# Patient Record
Sex: FEMALE | Race: WHITE | Employment: FULL TIME | URBAN - METROPOLITAN AREA
[De-identification: names, ages, dates, MRNs, and addresses within clinical notes are randomized per-mention and may not be internally consistent; named-entity substitution may affect disease eponyms.]

---

## 2020-07-23 ENCOUNTER — VIRTUAL VISIT (OUTPATIENT)
Dept: DERMATOLOGY | Facility: CLINIC | Age: 40
End: 2020-07-23
Payer: COMMERCIAL

## 2020-07-23 DIAGNOSIS — R05.9 COUGH: ICD-10-CM

## 2020-07-23 DIAGNOSIS — L81.1 MELASMA: Primary | ICD-10-CM

## 2020-07-23 PROBLEM — J30.81 ALLERGIC RHINITIS DUE TO ANIMAL (CAT) (DOG) HAIR AND DANDER: Status: ACTIVE | Noted: 2019-09-02

## 2020-07-23 PROBLEM — S62.630B DISPLACED FRACTURE OF DISTAL PHALANX OF RIGHT INDEX FINGER, INITIAL ENCOUNTER FOR OPEN FRACTURE: Status: ACTIVE | Noted: 2017-12-07

## 2020-07-23 PROBLEM — T78.1XXA POLLEN-FOOD ALLERGY: Status: ACTIVE | Noted: 2019-09-02

## 2020-07-23 PROBLEM — J30.1 CHRONIC SEASONAL ALLERGIC RHINITIS DUE TO POLLEN: Status: ACTIVE | Noted: 2019-09-02

## 2020-07-23 PROBLEM — J30.89 ALLERGIC RHINITIS DUE TO MOLD: Status: ACTIVE | Noted: 2019-09-02

## 2020-07-23 PROBLEM — S68.625A PARTIAL TRAUMATIC TRANSPHALANGEAL AMPUTATION OF LEFT RING FINGER: Status: ACTIVE | Noted: 2017-12-07

## 2020-07-23 PROBLEM — H10.13 ALLERGIC CONJUNCTIVITIS OF BOTH EYES: Status: ACTIVE | Noted: 2019-09-02

## 2020-07-23 PROBLEM — J30.89 ALLERGIC RHINITIS DUE TO DUST MITE: Status: ACTIVE | Noted: 2019-09-02

## 2020-07-23 PROCEDURE — 99202 OFFICE O/P NEW SF 15 MIN: CPT | Mod: 95 | Performed by: DERMATOLOGY

## 2020-07-23 RX ORDER — GABAPENTIN 300 MG/1
300 CAPSULE ORAL
COMMUNITY
Start: 2019-04-29

## 2020-07-23 RX ORDER — FEXOFENADINE HCL 180 MG/1
180 TABLET ORAL
COMMUNITY

## 2020-07-23 RX ORDER — SUMATRIPTAN 100 MG/1
100 TABLET, FILM COATED ORAL
COMMUNITY
Start: 2019-06-09

## 2020-07-23 RX ORDER — TRETINOIN 0.25 MG/G
CREAM TOPICAL
Qty: 45 G | Refills: 1 | Status: SHIPPED | OUTPATIENT
Start: 2020-07-23 | End: 2020-10-30

## 2020-07-23 RX ORDER — AZELAIC ACID 0.15 G/G
GEL TOPICAL
Qty: 50 G | Refills: 3 | Status: SHIPPED | OUTPATIENT
Start: 2020-07-23

## 2020-07-23 RX ORDER — ERENUMAB-AOOE 70 MG/ML
INJECTION SUBCUTANEOUS
COMMUNITY
Start: 2019-07-18

## 2020-07-23 RX ORDER — PSYLLIUM HUSK 0.4 G
1000 CAPSULE ORAL
COMMUNITY

## 2020-07-23 RX ORDER — MONTELUKAST SODIUM 10 MG/1
10 TABLET ORAL
COMMUNITY
Start: 2019-08-15 | End: 2021-05-11

## 2020-07-23 ASSESSMENT — PAIN SCALES - GENERAL: PAINLEVEL: NO PAIN (0)

## 2020-07-23 NOTE — LETTER
7/23/2020         RE: Caterina Sainz  [address]  [place] [address]        Dear Colleague,    Thank you for referring your patient, Caterina Sainz, to the [facility]. Please see a copy of my visit note below.    [facility]  Mychart Connected      Impression and Recommendations (Patient Counseled on the Following):  1. Hyperpigmentation ,left forehead, most likely melasma with solar lentigenes  -sun protection, recommend skinsceuticals daily  -finacea once daily in the am  - Hydroquinone can be considered, please check FDA website regarding studies recommending on this  2. Cough, had body aches, and cold like symptoms, reports no coronavirus contact  -recommend on care for coronavirus testing.  ordered         Follow-up:   Follow-up with dermatology in approximately 6 weeks. Earlier for new or changing lesions or rash.      Staff only    Janki Steele MD    Department of Dermatology  [facility]: Phone: [phone], Fax:[phone]  [facility]: Phone: [phone], Fax: [phone]        _____________________________________________________________________________    Dermatology Problem List:  NEw    Encounter Date: Jul 23, 2020    CC:   Chief Complaint   Patient presents with     Derm Problem     Dark Spots          History of Present Illness:  I have reviewed the teledermatology information and the nursing intake corresponding to this issue. Caterina Sainz is a 40 year old female who presents via teledermatology for dark spots on the face, forehead, right and left cheeks, more on the left started this summer. Using sunscreen. Has IUD since 2017. NO darkening upper lip.     Has seen derm in the past but no hx of skin cancer       ROS:    Had sinus infection 2 months ago. Has  A cold today.   Physical  "Examination:  General: Well-appearing , appropriately-developed individual.  Skin: Focused examination within the teledermatology photograph(s) including face was performed.  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  No past surgical history on file.    Social History:  Patient reports that she has never smoked. She has never used smokeless tobacco.working from home    Family History:  No family history on file.  NO family hx of skin cancer  Medications:  Current Outpatient Medications   Medication     cholecalciferol (VITAMIN D-1000 MAX ST) 25 MCG (1000 UT) TABS     erenumab-aooe (AIMOVIG) 70 MG/ML injection     fexofenadine (ALLEGRA) 180 MG tablet     montelukast (SINGULAIR) 10 MG tablet     SUMAtriptan (IMITREX) 100 MG tablet     Topiramate (TOPAMAX PO)     gabapentin (NEURONTIN) 300 MG capsule     No current facility-administered medications for this visit.           Allergies   Allergen Reactions     Nuts Other (See Comments)     \"Oral allergy syndrome.\"     Cats Itching     No Clinical Screening - See Comments Itching     Seasonal, dogs, cats, trees, dust, mold          _____________________________________________________________________________    Teledermatology information:  - Location of patient: [place]  - Patient presented as: self referral  - " "Location of teledermatologist:  ([facility] )  - Reason teledermatology is appropriate:  of National Emergency Regarding Coronavirus disease (COVID 19) Outbreak  - Image quality and interpretability: acceptable  - Physician has received verbal consent for a Video/Photos Visit from the patient? Yes  - In-person dermatology visit recommendation: no  - Date of images: 7/21/2020  - Service start time:8:20am  - Service end time:8:40am  - Date of report: 7/23/2020           Teledermatology Nurse Call for NEW Patients (not seen in last 3 years):     The patient was contacted by phone and we reviewed they have a visit in teledermatology upcoming with an MD or [facility];  Importantly, \"a teledermatology visit may not be as thorough as an in-person visit, and the quality of the photograph and/or video sent may not be of the same quality as that taken by the dermatology clinic.\"     This video visit will be conducted via a call between you and your physician/provider via [employer]. We have found that certain health care needs can be provided without the need for an in-person physical exam.  This service lets us provide the care you need with a video conversation.  If a prescription is necessary we can send it directly to your pharmacy.  If lab work is needed we can place an order for that and you can then stop by our lab to have the test done at a later time.If during the course of the call the physician/provider feels a video visit is not appropriate, you will not be charged for this service.Visits are billed at different rates depending on your insurance coverage. Please reach out to your insurance provider with any questions.    The patient will also send photographs via [employer] for review. Instructions for photography are/were sent to the patient via [employer] messaging.       The patient verified the location of the photo/video visit to be [place].(The physician must be notified by nurse if the patient is not in " the state of [place] during the encounter)    The patient denied skin pain, fever, mucosal symptoms(lesions, blisters, sores in the mouth, nose, eyes, or genitals)  IF PATIENT ENDORSES ANY OF THESE STOP AND PAGE ON CALL ATTENDING                    Again, thank you for allowing me to participate in the care of your patient.        Sincerely,        Janki Steele MD

## 2020-07-23 NOTE — PATIENT INSTRUCTIONS
[facility] Teledermatology Visit    Thank you for allowing us to participate in your care. Your findings, instructions and follow-up plan are as follows:  1.skinceuticals fusion sunscreen Shipped to your Home From [facility]:   -Call [phone] and ask to have product shipped to you.   2. Finacea(azelaic acid once daily)  3. Hydroquinone can be considered, please check FDA website regarding studies recommending on this  4. Tretinoin at night for aging and melasma  5. Cetaphil or CeraVE    When should I call my doctor?    If you are worsening or not improving, please, contact us or seek urgent care as noted below.     Who should I call with questions (adults)?    [facility] (adult and pediatric): [phone]     [facility] (adult): [phone]    For urgent needs outside of business hours call the [facility] at [phone] and ask for the dermatology resident on call    If this is a medical emergency and you are unable to reach an ER, Call [phone]      Who should I call with questions (pediatric)?  [facility]- Pediatric Dermatology  Dr. Sadaf Humphries, Dr. Myla Harding, Dr. Maryann Mckenna, Prema Araujo, MARSHA Goodman, Dr. Jaky Nieto & Dr. Jamar Jose  Non Urgent  Nurse Triage Line; [phone]- Danuta and Jennifer TONEY Care Coordinators   Marcela (/Complex ) [phone]    If you need a prescription refill, please contact your pharmacy. Refills are approved or denied by our Physicians during normal business hours, Monday through Fridays  Per office policy, refills will not be granted if you have not been seen within the past year (or sooner depending on your child's condition)    Scheduling Information:  Pediatric Appointment Scheduling and Call Center [phone]  Radiology Scheduling- [phone]  Sedation Unit Scheduling-  [phone]  [facility]- General [phone]; Pediatric Dermatology [phone]  Main  Services: [phone]  Amharic: [phone]  East Timorese: [phone]  Hmong/Citizen of [place]/Syriac: [phone]  Preadmission Nursing Department Fax Number: [phone] (Fax all pre-operative paperwork to this number)    For urgent matters arising during evenings, weekends, or holidays that cannot wait for normal business hours please call [phone] and ask for the Dermatology Resident On-Call to be paged.    Topical Retinoids    What are topical retinoids?    These are medicines that are related to Vitamin A. They are used on the skin.    Retin-A , Renova , Differin , and Tazorac  are brand names.    Come in creams and clear gels    Used to treat skin conditions like pimples (acne), face wrinkling, or dark-colored sunspots    How do I use these medicines?    Wash face and let dry for 15 to 30 minutes.    Use a large pea-size amount of medicine to cover the whole face. Do not put on close to the eyes and lips. Rub in gently.     Start by using every other day. If you have no irritation after a few days, start to use it daily.     You might have too much irritation with daily use. Use it less often until the irritation goes away. Then try to increase slowly to daily use.     Irritation improves over time.    You may use moisturizer if your skin becomes dry. Look for  non-comedogenic  (non-pore plugging) and oil free products.     What are the side effects?    Dryness     Peeling and flaking     Irritation of the skin     Possible increased chance of sunburns. Protect your skin from sunlight. Wear a hat and use a sunscreen with SPF 30 or higher. Your sunscreen should have both UVA and UVB (broad-spectrum) protection.    Who should I call with questions?    [facility]: [phone]     [facility]: [phone]    For urgent needs outside of business  hours call the [facility] at [phone] and ask for the dermatology resident on call

## 2020-07-23 NOTE — PROGRESS NOTES
[facility]  Mychart [facility]      Impression and Recommendations (Patient Counseled on the Following):  1. Hyperpigmentation ,left forehead, most likely melasma with solar lentigenes  -sun protection, recommend skinsceuticals daily  -finacea once daily in the am  - Hydroquinone can be considered, please check FDA website regarding studies recommending on this  2. Cough, had body aches, and cold like symptoms, reports no coronavirus contact  -recommend on care for coronavirus testing.  ordered         Follow-up:   Follow-up with dermatology in approximately 6 weeks. Earlier for new or changing lesions or rash.      Staff only    Janki Steele MD    Department of Dermatology  [facility]: Phone: [phone], Fax:[phone]  [facility]: Phone: [phone], Fax: [phone]        _____________________________________________________________________________    Dermatology Problem List:  NEw    Encounter Date: Jul 23, 2020    CC:   Chief Complaint   Patient presents with     Derm Problem     Dark Spots          History of Present Illness:  I have reviewed the teledermatology information and the nursing intake corresponding to this issue. Caterina Sainz is a 40 year old female who presents via teledermatology for dark spots on the face, forehead, right and left cheeks, more on the left started this summer. Using sunscreen. Has IUD since 2017. NO darkening upper lip.     Has seen derm in the past but no hx of skin cancer       ROS:    Had sinus infection 2 months ago. Has  A cold today.   Physical Examination:  General: Well-appearing , appropriately-developed individual.  Skin: Focused examination within the teledermatology photograph(s) including face was performed.  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  No past surgical history on file.    Social History:  Patient reports that she has never smoked. She has never used smokeless tobacco.working from home    Family History:  No family history on file.  NO family hx of skin cancer  Medications:  Current Outpatient Medications   Medication     cholecalciferol (VITAMIN D-1000 MAX ST) 25 MCG (1000 UT) TABS     erenumab-aooe (AIMOVIG) 70 MG/ML injection     fexofenadine (ALLEGRA) 180 MG tablet     montelukast (SINGULAIR) 10 MG tablet     SUMAtriptan (IMITREX) 100 MG tablet     Topiramate (TOPAMAX PO)     gabapentin (NEURONTIN) 300 MG capsule     No current facility-administered medications for this visit.           Allergies   Allergen Reactions     Nuts Other (See Comments)     \"Oral allergy syndrome.\"     Cats Itching     No Clinical Screening - See Comments Itching     Seasonal, dogs, cats, trees, dust, mold          _____________________________________________________________________________    Teledermatology information:  - Location of patient: [place]  - Patient presented as: self referral  - Location of teledermatologist:  ([facility] )  - Reason teledermatology is appropriate:  of National Emergency Regarding Coronavirus disease (COVID 19) Outbreak  - Image quality and interpretability: acceptable  - Physician has received verbal consent for " "a Video/Photos Visit from the patient? Yes  - In-person dermatology visit recommendation: no  - Date of images: 7/21/2020  - Service start time:8:20am  - Service end time:8:40am  - Date of report: 7/23/2020           Teledermatology Nurse Call for NEW Patients (not seen in last 3 years):     The patient was contacted by phone and we reviewed they have a visit in teledermatology upcoming with an MD or [facility];  Importantly, \"a teledermatology visit may not be as thorough as an in-person visit, and the quality of the photograph and/or video sent may not be of the same quality as that taken by the dermatology clinic.\"     This video visit will be conducted via a call between you and your physician/provider via [employer]. We have found that certain health care needs can be provided without the need for an in-person physical exam.  This service lets us provide the care you need with a video conversation.  If a prescription is necessary we can send it directly to your pharmacy.  If lab work is needed we can place an order for that and you can then stop by our lab to have the test done at a later time.If during the course of the call the physician/provider feels a video visit is not appropriate, you will not be charged for this service.Visits are billed at different rates depending on your insurance coverage. Please reach out to your insurance provider with any questions.    The patient will also send photographs via [employer] for review. Instructions for photography are/were sent to the patient via [employer] messaging.       The patient verified the location of the photo/video visit to be [place].(The physician must be notified by nurse if the patient is not in the state of [place] during the encounter)    The patient denied skin pain, fever, mucosal symptoms(lesions, blisters, sores in the mouth, nose, eyes, or genitals)  IF PATIENT ENDORSES ANY OF THESE STOP AND PAGE ON CALL ATTENDING                  "

## 2020-07-24 DIAGNOSIS — R05.9 COUGH: ICD-10-CM

## 2020-07-24 PROCEDURE — U0003 INFECTIOUS AGENT DETECTION BY NUCLEIC ACID (DNA OR RNA); SEVERE ACUTE RESPIRATORY SYNDROME CORONAVIRUS 2 (SARS-COV-2) (CORONAVIRUS DISEASE [COVID-19]), AMPLIFIED PROBE TECHNIQUE, MAKING USE OF HIGH THROUGHPUT TECHNOLOGIES AS DESCRIBED BY CMS-2020-01-R: HCPCS | Performed by: DERMATOLOGY

## 2020-07-25 LAB
SARS-COV-2 RNA SPEC QL NAA+PROBE: NOT DETECTED
SPECIMEN SOURCE: NORMAL

## 2020-10-30 ENCOUNTER — OFFICE VISIT (OUTPATIENT)
Dept: DERMATOLOGY | Facility: CLINIC | Age: 40
End: 2020-10-30
Payer: COMMERCIAL

## 2020-10-30 DIAGNOSIS — L81.1 MELASMA: ICD-10-CM

## 2020-10-30 PROCEDURE — 99213 OFFICE O/P EST LOW 20 MIN: CPT | Performed by: DERMATOLOGY

## 2020-10-30 RX ORDER — TRETINOIN 0.25 MG/G
CREAM TOPICAL
Qty: 45 G | Refills: 1 | Status: SHIPPED | OUTPATIENT
Start: 2020-10-30

## 2020-10-30 RX ORDER — PHENTERMINE HYDROCHLORIDE 37.5 MG/1
37.5 TABLET ORAL
COMMUNITY
Start: 2020-08-04

## 2020-10-30 ASSESSMENT — PAIN SCALES - GENERAL: PAINLEVEL: NO PAIN (0)

## 2020-10-30 NOTE — LETTER
10/30/2020         RE: Caterina Sainz  [address]  [place] [address]        Dear Colleague,    Thank you for referring your patient, Caterina Sainz, to the [facility]. Please see a copy of my visit note below.    [facility]  In [facility] visit        Dermatology Problem List:  Melasma  Encounter Date: Oct 30, 2020    CC:   Chief Complaint   Patient presents with     Derm Problem     Face check only for melasma - f/u on virtual visit from 7/23, area of concern: freckle on left forehead that's changed over the last year         History of Present Illness:  Reports melasma is improved. Using finacea. Does not want hydroquinone. Using skinceuticals. Insurance will not covered tretinoin, wants to try target    ROS:    Feeling well today  Physical Examination:  General: Well-appearing , appropriately-developed individual.  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  No past surgical history on file.    Social History:  Patient reports that she has never smoked. She has never used smokeless tobacco.working from home    Family History:  No family history on file.  NO family hx of skin cancer  Medications:  Current Outpatient Medications   Medication     azelaic acid (FINACIA) 15 % external gel     cholecalciferol (VITAMIN D-1000 MAX  "ST) 25 MCG (1000 UT) TABS     erenumab-aooe (AIMOVIG) 70 MG/ML injection     fexofenadine (ALLEGRA) 180 MG tablet     montelukast (SINGULAIR) 10 MG tablet     phentermine (ADIPEX-P) 37.5 MG tablet     SUMAtriptan (IMITREX) 100 MG tablet     Topiramate (TOPAMAX PO)     tretinoin (RETIN-A) 0.025 % external cream     gabapentin (NEURONTIN) 300 MG capsule     No current facility-administered medications for this visit.           Allergies   Allergen Reactions     Nuts Other (See Comments)     \"Oral allergy syndrome.\"     Cats Itching     No Clinical Screening - See Comments Itching     Seasonal, dogs, cats, trees, dust, mold             Impression and Recommendations (Patient Counseled on the Following):  1. Hyperpigmentation ,left forehead, most likely melasma with solar lentigenes. This is consistent with live visit today, improving  -sun protection, recommend skinsceuticals daily, she is using this  -finacea once daily in the am, she is using this  - Hydroquinone is declined by patient   -tretinoin, reviewed a pea sized amount every other day, work up to daily as tolerated.         Follow-up:   Follow-up with dermatology in approximately 1 year for melasma and recommend she schedule skin exam when able     Staff only    Janki Steele MD    Department of Dermatology  [facility]: Phone: [phone], Fax:[phone]  [facility]: Phone: [phone], Fax: [phone]               Again, thank you for allowing me to participate in the care of your patient.        Sincerely,        Janki Steele MD    "

## 2020-10-30 NOTE — NURSING NOTE
Caterina Sainz's goals for this visit include:   Chief Complaint   Patient presents with     Derm Problem     Face check only for melasma - f/u on virtual visit from 7/23, area of concern: freckle on left forehead that's changed over the last year     She requests these members of her care team be copied on today's visit information: N/A    PCP: No Ref-Primary, Physician    Referring Provider:  No referring provider defined for this encounter.    There were no vitals taken for this visit.    Do you need any medication refills at today's visit? No    Bekah Garcia LPN       Pt back from CT, no distress, updated on poc.

## 2020-10-30 NOTE — PROGRESS NOTES
[facility]  [facility]        Dermatology Problem List:  Melasma  Encounter Date: Oct 30, 2020    CC:   Chief Complaint   Patient presents with     Derm Problem     Face check only for melasma - f/u on virtual visit from 7/23, area of concern: freckle on left forehead that's changed over the last year         History of Present Illness:  Reports melasma is improved. Using finacea. Does not want hydroquinone. Using skinceuticals. Insurance will not covered tretinoin, wants to try target    ROS:    Feeling well today  Physical Examination:  General: Well-appearing , appropriately-developed individual.  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  No past surgical history on file.    Social History:  Patient reports that she has never smoked. She has never used smokeless tobacco.working from home    Family History:  No family history on file.  NO family hx of skin cancer  Medications:  Current Outpatient Medications   Medication     azelaic acid (FINACIA) 15 % external gel     cholecalciferol (VITAMIN D-1000 MAX ST) 25 MCG (1000 UT) TABS     erenumab-aooe (AIMOVIG) 70 MG/ML injection     fexofenadine (ALLEGRA) 180 MG tablet     montelukast (SINGULAIR) 10 MG tablet     phentermine (ADIPEX-P) 37.5 MG tablet     SUMAtriptan (IMITREX) 100 MG tablet     Topiramate (TOPAMAX PO)  "    tretinoin (RETIN-A) 0.025 % external cream     gabapentin (NEURONTIN) 300 MG capsule     No current facility-administered medications for this visit.           Allergies   Allergen Reactions     Nuts Other (See Comments)     \"Oral allergy syndrome.\"     Cats Itching     No Clinical Screening - See Comments Itching     Seasonal, dogs, cats, trees, dust, mold             Impression and Recommendations (Patient Counseled on the Following):  1. Hyperpigmentation ,left forehead, most likely melasma with solar lentigenes. This is consistent with live visit today, improving  -sun protection, recommend skinsceuticals daily, she is using this  -finacea once daily in the am, she is using this  - Hydroquinone is declined by patient   -tretinoin, reviewed a pea sized amount every other day, work up to daily as tolerated.         Follow-up:   Follow-up with dermatology in approximately 1 year for melasma and recommend she schedule skin exam when able     Staff only    Janki Steele MD    Department of Dermatology  [facility]: Phone: [phone], Fax:[phone]  [facility]: Phone: [phone], Fax: [phone]           "

## 2021-01-04 ENCOUNTER — HEALTH MAINTENANCE LETTER (OUTPATIENT)
Age: 41
End: 2021-01-04

## 2021-10-11 ENCOUNTER — HEALTH MAINTENANCE LETTER (OUTPATIENT)
Age: 41
End: 2021-10-11

## 2022-01-30 ENCOUNTER — HEALTH MAINTENANCE LETTER (OUTPATIENT)
Age: 42
End: 2022-01-30

## 2022-09-24 ENCOUNTER — HEALTH MAINTENANCE LETTER (OUTPATIENT)
Age: 42
End: 2022-09-24

## 2023-05-08 ENCOUNTER — HEALTH MAINTENANCE LETTER (OUTPATIENT)
Age: 43
End: 2023-05-08

## 2023-08-05 ENCOUNTER — HEALTH MAINTENANCE LETTER (OUTPATIENT)
Age: 43
End: 2023-08-05

## 2024-10-04 ENCOUNTER — OFFICE VISIT (OUTPATIENT)
Dept: FAMILY MEDICINE CLINIC | Facility: CLINIC | Age: 44
End: 2024-10-04
Payer: COMMERCIAL

## 2024-10-04 VITALS
RESPIRATION RATE: 18 BRPM | WEIGHT: 278 LBS | HEART RATE: 104 BPM | OXYGEN SATURATION: 99 % | DIASTOLIC BLOOD PRESSURE: 96 MMHG | TEMPERATURE: 97 F | BODY MASS INDEX: 51.16 KG/M2 | SYSTOLIC BLOOD PRESSURE: 138 MMHG | HEIGHT: 62 IN

## 2024-10-04 DIAGNOSIS — J40 SINOBRONCHITIS: Primary | ICD-10-CM

## 2024-10-04 DIAGNOSIS — J32.9 SINOBRONCHITIS: Primary | ICD-10-CM

## 2024-10-04 PROCEDURE — 99202 OFFICE O/P NEW SF 15 MIN: CPT | Performed by: PHYSICIAN ASSISTANT

## 2024-10-04 RX ORDER — TRAZODONE HYDROCHLORIDE 100 MG/1
100 TABLET ORAL NIGHTLY
COMMUNITY

## 2024-10-04 RX ORDER — PREDNISONE 20 MG/1
TABLET ORAL
Qty: 12 TABLET | Refills: 0 | Status: SHIPPED | OUTPATIENT
Start: 2024-10-04

## 2024-10-04 RX ORDER — DEXTROAMPHETAMINE SACCHARATE, AMPHETAMINE ASPARTATE, DEXTROAMPHETAMINE SULFATE AND AMPHETAMINE SULFATE 7.5; 7.5; 7.5; 7.5 MG/1; MG/1; MG/1; MG/1
TABLET ORAL DAILY
COMMUNITY

## 2024-10-04 RX ORDER — ERENUMAB-AOOE 140 MG/ML
140 INJECTION, SOLUTION SUBCUTANEOUS ONCE
COMMUNITY

## 2024-10-04 RX ORDER — BENZONATATE 200 MG/1
200 CAPSULE ORAL 3 TIMES DAILY PRN
Qty: 30 CAPSULE | Refills: 0 | Status: SHIPPED | OUTPATIENT
Start: 2024-10-04

## 2024-10-04 RX ORDER — ALBUTEROL SULFATE 90 UG/1
2 INHALANT RESPIRATORY (INHALATION) EVERY 6 HOURS PRN
Qty: 1 EACH | Refills: 0 | Status: SHIPPED | OUTPATIENT
Start: 2024-10-04

## 2024-10-04 NOTE — PROGRESS NOTES
CHIEF COMPLAINT:     Chief Complaint   Patient presents with    Sinus Problem     Started over the weekend, a lot of nasal congestion last night   No fever   Home covid test negative        HPI:   Tereza Wharton is a 44 year old female visiting from [place] who presents with sinus pressure, cough, congestion for 5-6 days.  Home covid test is negative.  Feels she is getting a sinus infection.  Facial pressure and pain is localized to the right mid face.  Ears feel full and right upper teeth were hurting.       Associated symptoms:    Fever/Chills  No  Sore throat  Yes initially but now better  Cough  Yes   Congestion Yes  Bodyache  Yes  Headache  Yes sinus symptoms setting off her migraines  Chest pain No  SOB/Dyspnea No but does feel tight and wheezy  Diarrhea No  Vomiting No      Current Outpatient Medications   Medication Sig Dispense Refill    Sertraline HCl (ZOLOFT OR) Take by mouth.      traZODone 100 MG Oral Tab Take 1 tablet (100 mg total) by mouth nightly.      Erenumab-aooe (AIMOVIG) 140 MG/ML Subcutaneous Solution Auto-injector Inject 1 mL (140 mg total) into the skin once.      amphetamine-dextroamphetamine 30 MG Oral Tab Take by mouth daily.      predniSONE 20 MG Oral Tab Take 2 po once daily for 5 days, then take 1 po once daily for 2 days, then stop. 12 tablet 0    albuterol 108 (90 Base) MCG/ACT Inhalation Aero Soln Inhale 2 puffs into the lungs every 6 (six) hours as needed for Wheezing. 1 each 0    amoxicillin clavulanate 875-125 MG Oral Tab Take 1 tablet by mouth 2 (two) times daily for 10 days. 20 tablet 0    benzonatate 200 MG Oral Cap Take 1 capsule (200 mg total) by mouth 3 (three) times daily as needed for cough. 30 capsule 0      No past medical history on file.   Social History:  Social History     Socioeconomic History    Marital status: Single     Social Determinants of Health     Financial Resource Strain: Not At Risk (5/30/2023)    Received from [facility]  Strain     Is it hard for you to pay for the very basics like food, housing, medical care or heating?: No   Food Insecurity: No Food Insecurity (11/14/2023)    Received from [facility]    Food Insecurity     Currently or in the past 3 months, have you worried your food would run out before you had money to buy more?: No     In the past 12 months, have you run out of food or been unable to get more?: No   Transportation Needs: No Transportation Needs (11/14/2023)    Received from [facility]    Transportation Needs     Medical Transportation Needs?: No    Received from [employer] [facility]    Social Connections    Received from [facility]    Housing Stability        Review of Systems:    Positive for stated complaint: sinus.   Pertinent positives and negatives noted in the the HPI.    EXAM:   BP (!) 138/96   Pulse 104   Temp 97.2 °F (36.2 °C)   Resp 18   Ht 5' 2\" (1.575 m)   Wt 278 lb (126.1 kg)   LMP 09/18/2024 (Approximate)   SpO2 99%   BMI 50.85 kg/m²   GENERAL: well developed, well nourished,in no apparent distress  SKIN: no rashes,no suspicious lesions  HEAD: atraumatic, normocephalic  EYES: conjunctiva clear, sclera white,  PERRLA  EARS: TM's non erythematous  NOSE: nares patent, mucosa is congested.  + tenderness over right maxillary sinus.  No external swelling or erythema is noted.  THROAT: Posterior pharynx is non erythematous  NECK: supple, non-tender  LUNGS: clear to auscultation bilaterally without rale, ronchi, wheeze.  CARDIO: S1/S2 without murmur  EXTREMITIES: no cyanosis, clubbing or edema  LYMPH:  no gross lymphadenopathy.      No results found for this or any previous visit (from the past 24 hour(s)).      ASSESSMENT AND PLAN:   Tereza Wharton is a 44 year old female who presents with Sinus Problem (Started over the weekend, a lot of nasal congestion last night /No fever /Home covid test negative  ). Symptoms are consistent with:      ASSESSMENT:  Encounter Diagnosis   Name Primary?    Sinobronchitis Yes         PLAN:    Symptomatic care:   1. Rest. Drink plenty of fluids.  2. Tylenol or ibuprofen for discomfort or fever.   3. OTC decongestant (phenylephrine) expectorants (guaifenesin), nasal steroid sprays  (fluticasone) may be helpful        for congestion.  4. OTC cough suppressant for cough  (dextromethorphan)  5. Chloraseptic spray/throat lozenges for sore throat   6 augmentin as written  7 prednisone as written  8 albuterol as written  9 tessalon as written     Go to the ED for evaluation with progressive symptoms of difficulty swallowing, breathing, shortness of breath, chest pain, extreme weakness, or confusion.         Meds & Refills for this Visit:  Requested Prescriptions     Signed Prescriptions Disp Refills    predniSONE 20 MG Oral Tab 12 tablet 0     Sig: Take 2 po once daily for 5 days, then take 1 po once daily for 2 days, then stop.    albuterol 108 (90 Base) MCG/ACT Inhalation Aero Soln 1 each 0     Sig: Inhale 2 puffs into the lungs every 6 (six) hours as needed for Wheezing.    amoxicillin clavulanate 875-125 MG Oral Tab 20 tablet 0     Sig: Take 1 tablet by mouth 2 (two) times daily for 10 days.    benzonatate 200 MG Oral Cap 30 capsule 0     Sig: Take 1 capsule (200 mg total) by mouth 3 (three) times daily as needed for cough.       Risks, benefits, side effects of medication addressed and explained.    There are no Patient Instructions on file for this visit.    The patient indicates understanding of these issues and agrees to the plan.  The patient is asked to follow up PCP

## 2024-10-27 ENCOUNTER — APPOINTMENT (OUTPATIENT)
Dept: GENERAL RADIOLOGY | Age: 44
End: 2024-10-27
Attending: NURSE PRACTITIONER
Payer: COMMERCIAL

## 2024-10-27 ENCOUNTER — HOSPITAL ENCOUNTER (OUTPATIENT)
Age: 44
Discharge: HOME | End: 2024-10-27
Payer: COMMERCIAL

## 2024-10-27 VITALS
DIASTOLIC BLOOD PRESSURE: 89 MMHG | RESPIRATION RATE: 20 BRPM | BODY MASS INDEX: 51.53 KG/M2 | OXYGEN SATURATION: 95 % | TEMPERATURE: 98 F | HEART RATE: 96 BPM | SYSTOLIC BLOOD PRESSURE: 151 MMHG | HEIGHT: 62 IN | WEIGHT: 280 LBS

## 2024-10-27 DIAGNOSIS — R05.1 ACUTE COUGH: Primary | ICD-10-CM

## 2024-10-27 DIAGNOSIS — J06.9 VIRAL URI WITH COUGH: ICD-10-CM

## 2024-10-27 DIAGNOSIS — M94.0 COSTOCHONDRITIS, ACUTE: ICD-10-CM

## 2024-10-27 PROCEDURE — 71046 X-RAY EXAM CHEST 2 VIEWS: CPT | Performed by: NURSE PRACTITIONER

## 2024-10-27 PROCEDURE — 99213 OFFICE O/P EST LOW 20 MIN: CPT | Performed by: NURSE PRACTITIONER

## 2024-10-27 RX ORDER — PREDNISONE 20 MG/1
20 TABLET ORAL 2 TIMES DAILY
Qty: 10 TABLET | Refills: 0 | Status: SHIPPED | OUTPATIENT
Start: 2024-10-27 | End: 2024-11-01

## 2024-10-27 RX ORDER — TIRZEPATIDE 2.5 MG/.5ML
2.5 INJECTION, SOLUTION SUBCUTANEOUS WEEKLY
COMMUNITY
Start: 2024-10-23

## 2024-10-27 RX ORDER — BENZONATATE 100 MG/1
100 CAPSULE ORAL 3 TIMES DAILY PRN
Qty: 30 CAPSULE | Refills: 0 | Status: SHIPPED | OUTPATIENT
Start: 2024-10-27 | End: 2024-11-26

## 2024-10-27 NOTE — DISCHARGE INSTRUCTIONS
Follow-up with your primary care physician in one week if symptoms have not improved or symptoms are starting to get worse.  Increase fluids, keep well-hydrated.  Take Tylenol and Motrin for fever and pain.  Take the steroids twice for 5 days Tessalon Perles for cough  Return to the emergency room for symptoms or concerns

## 2024-10-27 NOTE — ED PROVIDER NOTES
Patient Seen in: [facility]      History     Chief Complaint   Patient presents with    Cough/URI     Stated Complaint: cough    Subjective:   HPI  44-year-old female presents to immediate care with complaints of cough on and off for last 4 weeks has been on Augmentin prednisone and albuterol which seem to help but now its come back.  Patient is also history of pleurisy thinks maybe she has a pleurisy flareup.  Denies any difficulty in breathing.  No other concerns at this time.  The patient's medication list, past medical history and social history elements as listed in today's nurse's notes were reviewed and agreed (except as otherwise stated in the HPI).  The patient's family history reviewed and determined to be noncontributory to the presenting problem.      Objective:     History reviewed. No pertinent past medical history.           History reviewed. No pertinent surgical history.             Social History     Socioeconomic History    Marital status: Single   Tobacco Use    Smoking status: Never    Smokeless tobacco: Never     Social Drivers of Health     Financial Resource Strain: Not At Risk (5/30/2023)    Received from [employer]    Financial Resource Strain     Is it hard for you to pay for the very basics like food, housing, medical care or heating?: No   Food Insecurity: No Food Insecurity (11/14/2023)    Received from [facility]    Food Insecurity     Currently or in the past 3 months, have you worried your food would run out before you had money to buy more?: No     In the past 12 months, have you run out of food or been unable to get more?: No   Transportation Needs: No Transportation Needs (11/14/2023)    Received from [facility]    Transportation Needs     Medical Transportation Needs?: No    Received from [email or web address]    Social Connections    Received from [facility]    Housing Stability               Review of Systems    Positive for stated complaint: cough  Other systems are as noted in HPI.  Constitutional and vital signs reviewed.      All other systems reviewed and negative except as noted above.    Physical Exam     ED Triage Vitals [10/27/24 0939]   /89   Pulse 96   Resp 20   Temp 97.7 °F (36.5 °C)   Temp src Temporal   SpO2 95 %   O2 Device None (Room air)       Current Vitals:   Vital Signs  BP: 151/89  Pulse: 96  Resp: 20  Temp: 97.7 °F (36.5 °C)  Temp src: Temporal    Oxygen Therapy  SpO2: 95 %  O2 Device: None (Room air)        Physical Exam  GENERAL: The patient is well-developed well-nourished nontoxic, non-ill-appearing  HEENT: Normocephalic.  Atraumatic.  Extraocular motions are intact  NECK: Supple.  No meningitic signs are noted.   CHEST/LUNGS: Clear to auscultation.  There is no respiratory distress noted.  HEART/CARDIOVASCULAR: Regular.  There is no tachycardia.   SKIN: There is no rash.  NEURO: The patient is awake, alert, and oriented.  The patient is cooperative.    ED Course   Labs Reviewed - No data to display       XR CHEST PA + LAT CHEST (CPT=71046)    Result Date: 10/27/2024  PROCEDURE:  XR CHEST PA + LAT CHEST (CPT=71046)  INDICATIONS:  cough  COMPARISON:  None.  TECHNIQUE:  PA and lateral chest radiographs were obtained.  PATIENT STATED HISTORY: (As transcribed by Technologist)  Patient with cough for a month, fatigue. History of pneumonia, pleurisy.    FINDINGS:  LUNGS:  Slight atelectasis in the lung bases.  No focal consolidation.  Normal vascularity. CARDIAC:  Normal size cardiac silhouette. MEDIASTINUM:  Normal. PLEURA:  No pneumothorax.  No pleural effusions. BONES:  Normal for age.            CONCLUSION:  Bibasilar atelectasis.   LOCATION:  [facility]   Dictated by (CST): Jess Payne MD on 10/27/2024 at 10:45 AM     Finalized by (CST): Jess Payne MD on 10/27/2024 at 10:45 AM             MDM   Patient present with signs and symptoms consistent with upper  respiratory infection and consider possible life-threatening etiologies are presenting complaint including severe bacterial infection, meningitis, acute cardiopulmonary process etc.  And doubt given; history, exam, chest x-ray with no acute cardiopulmonary process  and signs and symptoms which have markedly improved with management.  Suspect likely viral etiology of upper respiratory infection.  Patient afebrile with normal vital signs and patient nontoxic appearing, well-hydrated in no apparent distress and no respiratory distress, this will discharge to follow-up with primary care physician in 2-3 days.  Supportive care instructions provided.  Patient to take over-the-counter and Tylenol and Motrin as needed for fever and pain.  Advised to return to the emergency room if any new or worsening symptoms including but not limited to; change in mental status.  Meningeal signs, abdominal pain, nausea vomiting, chest pain/shortness of breath, new rash, decreased urinary output or any other concerns.    Please note that this report has been produced using speech recognition software and may contain errors related to that system including, but not limited to, errors in grammar, punctuation, and spelling, as well as words and phrases that possibly may have been recognized inappropriately.  If there are any questions or concerns, contact the dictating provider for clarification.      Medical Decision Making      Disposition and Plan     Clinical Impression:  1. Acute cough    2. Viral URI with cough    3. Costochondritis, acute         Disposition:  Discharge  10/27/2024 10:52 am    Follow-up:  No follow-up provider specified.        Medications Prescribed:  Discharge Medication List as of 10/27/2024 10:54 AM        START taking these medications    Details   !! predniSONE 20 MG Oral Tab Take 1 tablet (20 mg total) by mouth 2 (two) times daily for 5 days., Normal, Disp-10 tablet, R-0      !! benzonatate 100 MG Oral Cap Take  1 capsule (100 mg total) by mouth 3 (three) times daily as needed for cough., Normal, Disp-30 capsule, R-0       !! - Potential duplicate medications found. Please discuss with provider.              Supplementary Documentation:

## 2024-10-27 NOTE — ED INITIAL ASSESSMENT (HPI)
Cough for over a month. Seen at [facility] and treated with Augmentin, prednisone, and albuterol. No improvement. Hx of pneumonia. No fever. +fatigue.